# Patient Record
Sex: FEMALE | Race: OTHER
[De-identification: names, ages, dates, MRNs, and addresses within clinical notes are randomized per-mention and may not be internally consistent; named-entity substitution may affect disease eponyms.]

---

## 2020-10-08 PROBLEM — Z00.00 ENCOUNTER FOR PREVENTIVE HEALTH EXAMINATION: Status: ACTIVE | Noted: 2020-10-08

## 2020-11-05 ENCOUNTER — LABORATORY RESULT (OUTPATIENT)
Age: 65
End: 2020-11-05

## 2020-11-05 ENCOUNTER — APPOINTMENT (OUTPATIENT)
Dept: NEPHROLOGY | Facility: CLINIC | Age: 65
End: 2020-11-05
Payer: COMMERCIAL

## 2020-11-05 VITALS
OXYGEN SATURATION: 99 % | DIASTOLIC BLOOD PRESSURE: 86 MMHG | HEIGHT: 63.5 IN | HEART RATE: 81 BPM | BODY MASS INDEX: 18.38 KG/M2 | SYSTOLIC BLOOD PRESSURE: 130 MMHG | WEIGHT: 105 LBS | TEMPERATURE: 98.2 F

## 2020-11-05 DIAGNOSIS — N28.89 OTHER SPECIFIED DISORDERS OF KIDNEY AND URETER: ICD-10-CM

## 2020-11-05 DIAGNOSIS — N14.0: ICD-10-CM

## 2020-11-05 DIAGNOSIS — R80.9 PROTEINURIA, UNSPECIFIED: ICD-10-CM

## 2020-11-05 PROCEDURE — 99072 ADDL SUPL MATRL&STAF TM PHE: CPT

## 2020-11-05 PROCEDURE — 99205 OFFICE O/P NEW HI 60 MIN: CPT

## 2020-11-05 NOTE — HISTORY OF PRESENT ILLNESS
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\par \par BP fluctuating - currently on amlodipine, but had been on valsartan in the past raising possibility that proteinuria may have been present then\par \par Cr has been fluctuating btw ~0.86-1.1 since 2015, peaked at 1.39 in 9/2020\par Stopped both leflunamide indapamide and cr improved to ~ 1.08, GFR ~54.\par U microal elevated 160 alb/cr ( norm 24)\par \par Does take an alleve before hiking, maybe 1 before hiking once a month\par Drinks a cocktail +/- glass of wine daily\par no smoking\par \par 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

## 2020-11-05 NOTE — ASSESSMENT
[FreeTextEntry1] : Cr of 0.86-1.1 since ~2015, given presence of microal this is c/w ckd 3, however unclear if this reflects remote injury - not clear how long she has had proteinuria, has hx of analgesic nephropathy inher 20's, also notclear if she does have htn and if so whether arb would be better choice given the proteinuria - \par \par Do not rec diuretic, \par recheck UA along with Uptn\par given hx of chronic RA is at risk of 2ary amyloid\par depending on Uptn and U/A may consider changing CCB to low dose arb or acei\par \par Counseled on avoidance of NSAIDs\par reduce etoh intake\par

## 2020-11-05 NOTE — PHYSICAL EXAM
[General Appearance - Alert] : alert [General Appearance - In No Acute Distress] : in no acute distress [Sclera] : the sclera and conjunctiva were normal [Extraocular Movements] : extraocular movements were intact [Outer Ear] : the ears and nose were normal in appearance [Hearing Threshold Finger Rub Not [place]] : hearing was normal [Neck Appearance] : the appearance of the neck was normal [Jugular Venous Distention Increased] : there was no jugular-venous distention [Exaggerated Use Of Accessory Muscles For Inspiration] : no accessory muscle use [Apical Impulse] : the apical impulse was normal [Heart Sounds] : normal S1 and S2 [No CVA Tenderness] : no ~M costovertebral angle tenderness [No Spinal Tenderness] : no spinal tenderness [Abnormal Walk] : normal gait [Musculoskeletal - Swelling] : no joint swelling seen [Skin Color & Pigmentation] : normal skin color and pigmentation [] : no rash [Cranial Nerves] : cranial nerves 2-12 were intact [No Focal Deficits] : no focal deficits [Oriented To Time, Place, And Person] : oriented to person, place, and time [Affect] : the affect was normal [Edema] : there was no peripheral edema [Veins - Varicosity Changes] : there were no varicosital changes [Bowel Sounds] : normal bowel sounds [Abdomen Soft] : soft

## 2020-11-06 ENCOUNTER — TRANSCRIPTION ENCOUNTER (OUTPATIENT)
Age: 65
End: 2020-11-06

## 2020-11-11 ENCOUNTER — TRANSCRIPTION ENCOUNTER (OUTPATIENT)
Age: 65
End: 2020-11-11

## 2020-11-11 DIAGNOSIS — N39.0 URINARY TRACT INFECTION, SITE NOT SPECIFIED: ICD-10-CM

## 2020-11-11 LAB
APPEARANCE: CLEAR
BILIRUBIN URINE: NEGATIVE
BLOOD URINE: NEGATIVE
COLOR: COLORLESS
CREAT SPEC-SCNC: 26 MG/DL
CREAT/PROT UR: 0.3 RATIO
GLUCOSE QUALITATIVE U: NEGATIVE
KETONES URINE: NEGATIVE
LEUKOCYTE ESTERASE URINE: ABNORMAL
NITRITE URINE: NEGATIVE
PH URINE: 5.5
PROT UR-MCNC: 9 MG/DL
PROTEIN URINE: NEGATIVE
SPECIFIC GRAVITY URINE: 1
UROBILINOGEN URINE: NORMAL

## 2020-11-18 ENCOUNTER — APPOINTMENT (OUTPATIENT)
Dept: NEPHROLOGY | Facility: CLINIC | Age: 65
End: 2020-11-18
Payer: COMMERCIAL

## 2020-11-18 PROCEDURE — 36415 COLL VENOUS BLD VENIPUNCTURE: CPT

## 2020-11-20 LAB
APPEARANCE: CLEAR
BILIRUBIN URINE: NEGATIVE
BLOOD URINE: NEGATIVE
COLOR: NORMAL
GLUCOSE QUALITATIVE U: NEGATIVE
KETONES URINE: NEGATIVE
LEUKOCYTE ESTERASE URINE: NEGATIVE
NITRITE URINE: NEGATIVE
PH URINE: 5.5
PROTEIN URINE: NEGATIVE
SPECIFIC GRAVITY URINE: 1.01
UROBILINOGEN URINE: NORMAL

## 2020-12-23 PROBLEM — N39.0 ACUTE UTI: Status: RESOLVED | Noted: 2020-11-11 | Resolved: 2020-12-23

## 2021-05-07 ENCOUNTER — RESULT REVIEW (OUTPATIENT)
Age: 66
End: 2021-05-07

## 2021-05-07 ENCOUNTER — APPOINTMENT (OUTPATIENT)
Dept: NEPHROLOGY | Facility: CLINIC | Age: 66
End: 2021-05-07
Payer: COMMERCIAL

## 2021-05-07 VITALS
BODY MASS INDEX: 17.5 KG/M2 | DIASTOLIC BLOOD PRESSURE: 88 MMHG | SYSTOLIC BLOOD PRESSURE: 146 MMHG | WEIGHT: 100 LBS | HEART RATE: 90 BPM | OXYGEN SATURATION: 98 % | HEIGHT: 63.5 IN | TEMPERATURE: 97.6 F

## 2021-05-07 PROCEDURE — 99072 ADDL SUPL MATRL&STAF TM PHE: CPT

## 2021-05-07 PROCEDURE — 99214 OFFICE O/P EST MOD 30 MIN: CPT | Mod: 25

## 2021-05-07 PROCEDURE — 36415 COLL VENOUS BLD VENIPUNCTURE: CPT

## 2021-05-07 NOTE — PHYSICAL EXAM
[General Appearance - Alert] : alert [General Appearance - In No Acute Distress] : in no acute distress [Sclera] : the sclera and conjunctiva were normal [Extraocular Movements] : extraocular movements were intact [Outer Ear] : the ears and nose were normal in appearance [Hearing Threshold Finger Rub Not [place]] : hearing was normal [Neck Appearance] : the appearance of the neck was normal [Jugular Venous Distention Increased] : there was no jugular-venous distention [Exaggerated Use Of Accessory Muscles For Inspiration] : no accessory muscle use [Apical Impulse] : the apical impulse was normal [Heart Sounds] : normal S1 and S2 [Edema] : there was no peripheral edema [Veins - Varicosity Changes] : there were no varicosital changes [Bowel Sounds] : normal bowel sounds [Abdomen Soft] : soft [No CVA Tenderness] : no ~M costovertebral angle tenderness [No Spinal Tenderness] : no spinal tenderness [Abnormal Walk] : normal gait [Musculoskeletal - Swelling] : no joint swelling seen [Skin Color & Pigmentation] : normal skin color and pigmentation [] : no rash [Cranial Nerves] : cranial nerves 2-12 were intact [No Focal Deficits] : no focal deficits [Oriented To Time, Place, And Person] : oriented to person, place, and time [Affect] : the affect was normal

## 2021-05-12 NOTE — HISTORY OF PRESENT ILLNESS
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\par \par BP fluctuating - currently on amlodipine, but had been on valsartan in the past raising possibility that proteinuria may have been present then\par \par Cr has been fluctuating btw ~0.86-1.1 since 2015, peaked at 1.39 in 9/2020\par Stopped both leflunamide indapamide and cr improved to ~ 1.08, GFR ~54.\par U microal elevated 160 alb/cr ( norm 24)\par \par Does take an alleve before hiking, maybe 1 before hiking once a month\par Drinks a cocktail +/- glass of wine daily\par no smoking\par \par 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

## 2021-05-12 NOTE — ASSESSMENT
[FreeTextEntry1] : Cr of 0.86-1.1 since ~2015, given presence of microal this is c/w ckd 3, however unclear if this reflects remote injury - not clear how long she has had proteinuria, has hx of analgesic nephropathy in her 20's, also not clear if she does have htn and if so whether arb would be better choice given the proteinuria - \par \par Do not rec diuretic, \par recheck UA along with Uptn\par given hx of chronic RA is at risk of 2ary amyloid\par recheck Uptn, depending on Uptn and U/A may consider changing CCB to low dose arb or acei\par \par Counseled on avoidance of NSAIDs\par reduce etoh intake\par - may try asa or tylenol or tramadol\par \par R flank pain may be referred pain from DJD in spine - last bone scan over 10 years\par Check Bone scan\par \par LFTs fluctuating - abnormal in 11/2020 - stopped EtOH - normal 1/2021 - resumed ETOH and abnormal in 4/2021\par - advised she stop etoh for now\par - f/u with Hepatology\par \par BMET reviewed\par Uric acid reviewed\par iron panel reviewed\par Outside labs reviewed\par

## 2021-12-29 ENCOUNTER — RESULT REVIEW (OUTPATIENT)
Age: 66
End: 2021-12-29

## 2021-12-29 ENCOUNTER — APPOINTMENT (OUTPATIENT)
Dept: NEPHROLOGY | Facility: CLINIC | Age: 66
End: 2021-12-29
Payer: COMMERCIAL

## 2021-12-29 VITALS
TEMPERATURE: 97.5 F | HEIGHT: 63.5 IN | BODY MASS INDEX: 17.5 KG/M2 | SYSTOLIC BLOOD PRESSURE: 140 MMHG | DIASTOLIC BLOOD PRESSURE: 80 MMHG | OXYGEN SATURATION: 99 % | HEART RATE: 76 BPM | WEIGHT: 100 LBS

## 2021-12-29 DIAGNOSIS — R10.9 UNSPECIFIED ABDOMINAL PAIN: ICD-10-CM

## 2021-12-29 DIAGNOSIS — G89.29 UNSPECIFIED ABDOMINAL PAIN: ICD-10-CM

## 2021-12-29 PROCEDURE — 99214 OFFICE O/P EST MOD 30 MIN: CPT

## 2021-12-29 RX ORDER — METHOTREXATE 2.5 MG/1
2.5 TABLET ORAL
Refills: 0 | Status: COMPLETED | COMMUNITY

## 2021-12-29 NOTE — ASSESSMENT
[FreeTextEntry1] : Cr of 0.86-1.1 since ~2015, given presence of microal this is c/w ckd 3, however unclear if this reflects remote injury - not clear how long she has had proteinuria, has hx of analgesic nephropathy in her 20's, also notclear if she does have htn and if so whether arb would be better choice given the proteinuria - \par \par Do not rec diuretic, \par recheck UA along with Uptn\par given hx of chronic RA is at risk of 2ary amyloid\par depending on Uptn and U/A may consider changing CCB to low dose arb or acei\par \par Counseled on avoidance of NSAIDs\par reduced etoh intake\par \par Did have dexa scan, report reviewed, L spine and hips showed 1.8x SD putting her at risk 2-3x for a #\par Will see Rheum for treatment, rec increase Vit D to 2000 daily\par \par Labs from Hepatology 8/2o21 cr 0.95\par \par

## 2021-12-29 NOTE — HISTORY OF PRESENT ILLNESS
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\par \par BP fluctuating - currently on amlodipine, but had been on valsartan in the past raising possibility that proteinuria may have been present then\par \par Cr has been fluctuating btw ~0.86-1.1 since 2015, peaked at 1.39 in 9/2020\par Stopped both leflunamide indapamide and cr improved to ~ 1.08, GFR ~54.\par U microal elevated 160 alb/cr ( norm 24)\par \par Does take an alleve before hiking, maybe 1 before hiking once a month\par Drinks a cocktail +/- glass of wine daily\par no smoking\par \par 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

## 2021-12-29 NOTE — PHYSICAL EXAM
[General Appearance - Alert] : alert [General Appearance - In No Acute Distress] : in no acute distress [Sclera] : the sclera and conjunctiva were normal [Extraocular Movements] : extraocular movements were intact [Outer Ear] : the ears and nose were normal in appearance [Hearing Threshold Finger Rub Not [place]] : hearing was normal [Neck Appearance] : the appearance of the neck was normal [Jugular Venous Distention Increased] : there was no jugular-venous distention [Exaggerated Use Of Accessory Muscles For Inspiration] : no accessory muscle use [Apical Impulse] : the apical impulse was normal [Heart Sounds] : normal S1 and S2 [Edema] : there was no peripheral edema [Veins - Varicosity Changes] : there were no varicosital changes [Bowel Sounds] : normal bowel sounds [Abdomen Soft] : soft [No CVA Tenderness] : no ~M costovertebral angle tenderness [No Spinal Tenderness] : no spinal tenderness [Abnormal Walk] : normal gait [Musculoskeletal - Swelling] : no joint swelling seen [Skin Color & Pigmentation] : normal skin color and pigmentation [] : no rash [Cranial Nerves] : cranial nerves 2-12 were intact [No Focal Deficits] : no focal deficits [Oriented To Time, Place, And Person] : oriented to person, place, and time [Affect] : the affect was normal

## 2022-08-04 ENCOUNTER — RESULT REVIEW (OUTPATIENT)
Age: 67
End: 2022-08-04

## 2022-08-04 ENCOUNTER — APPOINTMENT (OUTPATIENT)
Dept: NEPHROLOGY | Facility: CLINIC | Age: 67
End: 2022-08-04

## 2022-08-04 VITALS
SYSTOLIC BLOOD PRESSURE: 138 MMHG | WEIGHT: 99 LBS | BODY MASS INDEX: 17.33 KG/M2 | HEART RATE: 72 BPM | TEMPERATURE: 98.3 F | OXYGEN SATURATION: 99 % | DIASTOLIC BLOOD PRESSURE: 80 MMHG | HEIGHT: 63.5 IN

## 2022-08-04 DIAGNOSIS — M06.9 RHEUMATOID ARTHRITIS, UNSPECIFIED: ICD-10-CM

## 2022-08-04 DIAGNOSIS — N18.30 CHRONIC KIDNEY DISEASE, STAGE 3 UNSPECIFIED: ICD-10-CM

## 2022-08-04 DIAGNOSIS — I10 ESSENTIAL (PRIMARY) HYPERTENSION: ICD-10-CM

## 2022-08-04 PROCEDURE — 99214 OFFICE O/P EST MOD 30 MIN: CPT

## 2022-08-04 NOTE — HISTORY OF PRESENT ILLNESS
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\par \par BP fluctuating - currently on amlodipine, but had been on valsartan in the past raising possibility that proteinuria may have been present then\par \par Cr has been fluctuating btw ~0.86-1.1 since 2015, peaked at 1.39 in 9/2020\par Stopped both leflunamide indapamide and cr improved to ~ 1.08, GFR ~54.\par U microal elevated 160 alb/cr ( norm 24)\par \par Does take an alleve before hiking, maybe 1 before hiking once a month\par Drinks a cocktail +/- glass of wine daily\par no smoking\par \par 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

## 2022-08-04 NOTE — PHYSICAL EXAM
[General Appearance - Alert] : alert [General Appearance - In No Acute Distress] : in no acute distress [Sclera] : the sclera and conjunctiva were normal [Extraocular Movements] : extraocular movements were intact [Outer Ear] : the ears and nose were normal in appearance [Hearing Threshold Finger Rub Not [place]] : hearing was normal [Neck Appearance] : the appearance of the neck was normal [Jugular Venous Distention Increased] : there was no jugular-venous distention [Exaggerated Use Of Accessory Muscles For Inspiration] : no accessory muscle use [Apical Impulse] : the apical impulse was normal [Heart Sounds] : normal S1 and S2 [Edema] : there was no peripheral edema [Veins - Varicosity Changes] : there were no varicosital changes [Bowel Sounds] : normal bowel sounds [Abdomen Soft] : soft [No CVA Tenderness] : no ~M costovertebral angle tenderness [No Spinal Tenderness] : no spinal tenderness [Abnormal Walk] : normal gait [Musculoskeletal - Swelling] : no joint swelling seen [Skin Color & Pigmentation] : normal skin color and pigmentation [] : no rash [Cranial Nerves] : cranial nerves 2-12 were intact [No Focal Deficits] : no focal deficits [Oriented To Time, Place, And Person] : oriented to person, place, and time [Affect] : the affect was normal

## 2022-08-04 NOTE — ASSESSMENT
[FreeTextEntry1] : Cr of 0.86-1.1 since ~2015, given presence of microal this is c/w ckd 3, however unclear if this reflects remote injury - not clear how long she has had proteinuria, has hx of analgesic nephropathy in her 20's, also notclear if she does have htn and if so whether arb would be better choice given the proteinuria - \par \par Do not rec diuretic, \par recheck UA along with Uptn\par given hx of chronic RA is at risk of 2ary amyloid\par depending on Uptn and U/A may consider changing CCB to low dose arb or acei\par \par Counseled on avoidance of NSAIDs\par reduced etoh intake\par \par Did have dexa scan, report reviewed, L spine and hips showed 1.8x SD putting her at risk 2-3x for a #\par Will see Rheum for treatment, rec increase Vit D to 2000 daily\par \par Labs from Hepatology 8/2021 cr 0.95\par \par